# Patient Record
Sex: FEMALE | Race: WHITE | Employment: OTHER | URBAN - METROPOLITAN AREA
[De-identification: names, ages, dates, MRNs, and addresses within clinical notes are randomized per-mention and may not be internally consistent; named-entity substitution may affect disease eponyms.]

---

## 2022-05-26 DIAGNOSIS — E03.9 PRIMARY HYPOTHYROIDISM: ICD-10-CM

## 2022-05-26 DIAGNOSIS — C73 PAPILLARY THYROID CARCINOMA (HCC): Primary | ICD-10-CM

## 2022-06-01 ENCOUNTER — OFFICE VISIT (OUTPATIENT)
Dept: ENDOCRINOLOGY | Age: 67
End: 2022-06-01
Payer: MEDICARE

## 2022-06-01 VITALS
DIASTOLIC BLOOD PRESSURE: 86 MMHG | HEART RATE: 60 BPM | OXYGEN SATURATION: 95 % | HEIGHT: 66 IN | WEIGHT: 204 LBS | BODY MASS INDEX: 32.78 KG/M2 | SYSTOLIC BLOOD PRESSURE: 126 MMHG

## 2022-06-01 DIAGNOSIS — E03.9 PRIMARY HYPOTHYROIDISM: ICD-10-CM

## 2022-06-01 DIAGNOSIS — C73 PAPILLARY THYROID CARCINOMA (HCC): Primary | ICD-10-CM

## 2022-06-01 PROCEDURE — 99214 OFFICE O/P EST MOD 30 MIN: CPT | Performed by: INTERNAL MEDICINE

## 2022-06-01 PROCEDURE — 1123F ACP DISCUSS/DSCN MKR DOCD: CPT | Performed by: INTERNAL MEDICINE

## 2022-06-01 RX ORDER — LEVOTHYROXINE SODIUM 100 MCG
100 TABLET ORAL DAILY
Qty: 90 TABLET | Refills: 3 | Status: SHIPPED | OUTPATIENT
Start: 2022-06-01

## 2022-06-01 RX ORDER — LEVOTHYROXINE SODIUM 100 MCG
100 TABLET ORAL DAILY
COMMUNITY
End: 2022-06-01 | Stop reason: SDUPTHER

## 2022-06-01 ASSESSMENT — ENCOUNTER SYMPTOMS
CONSTIPATION: 1
ROS SKIN COMMENTS: DENIES HAIR LOSS, DRY SKIN.
DIARRHEA: 0

## 2022-06-01 NOTE — PROGRESS NOTES
Gordon Byers MD, [facility]  [address], [address] [facility], [address] [place] [address]  Phone [phone]  Facsimile [phone]          Art Rosenthal is a 74 y.o. female seen 6/1/2022 for follow up of thyroid cancer and hypothyroidism        ASSESSMENT AND PLAN:    1. 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 Pathology did not reveal any high risk features and I therefore did not recommend radioactive iodine remnant ablation. Neck ultrasound negative 1/2020.  Her thyroglobulin level has been very low as expected, most recently 5/2022. She will follow-up in 1 year with a thyroglobulin level prior to visit. I will consider repeating a neck ultrasound in 3-5 years from the prior study.      2. Primary hypothyroidism  Goal TSH 0.4-2.0 (although 0.1-0.4 is acceptable in the absence of thyrotoxic symptoms). - SYNTHROID 100 MCG tablet; Take 1 tablet by mouth Daily  Dispense: 90 tablet; Refill: 3      Follow-up and Dispositions    · Return in about 1 year (around 6/1/2023). HISTORY OF PRESENT ILLNESS:    THYROID CANCER    Presentation: Thyroid nodule status post FNA biopsy (Dr. Phong Dubois) revealing dysplastic cells, status post total thyroidectomy (Dr. Mónica Vallejo) 4/19/2011 (pathology revealed 1 cm follicular variant PTC in left lobe, no extracapsular extension, no lymphovascular invasion, 3 benign lymph nodes, chronic lymphocytic thyroiditis, pT1aN0). Current symptoms: Denies neck lumps, lymphadenopathy.      Imaging:   Neck ultrasound 12/9/2011: No suspicious lymph nodes. Neck ultrasound 6/6/2013: No suspicious lymph nodes. Neck ultrasound 4/15/2015: No suspicious lymph nodes or thyroid bed masses. Neck ultrasound 8/7/2015: No masses in the right thyroid bed. There is a right level V lymph node measuring 0.59 x 0.94 x 1.30 cm with a central fatty hilum.  No masses in the left thyroid bed. There is a left level II lymph node measuring 0.43 x 0.81 x 1.12 cm with a generous central fatty hilum. Neck ultrasound 6/18/2018: The thyroid gland is surgically absent.  There are no masses in the thyroid bed.  Examination of the central and lateral cervical compartments reveals no abnormal lymph nodes bilaterally. Neck ultrasound 1/13/2020: The thyroid gland is surgically absent.  There are no masses in the thyroid bed.  Examination of the central and lateral cervical compartments reveals no abnormal lymph nodes bilaterally. Labs:   3/15/2011: TSH 7.73, TPO Ab 650, Tg Ab <20.   6/7/2011: TSH 0.05, free T4 0.94, Tg <0.2, Tg Ab <20.   8/8/2011: TSH 0.07.   11/14/2011: TSH 0.29, free T4 1.12, Tg <0.2, Tg Ab <20.   5/18/2012: TSH 0.17, free T4 1.08, Tg <0.2, Tg Ab <20.   11/21/2012: TSH 0.120, free T4 1.65, Tg <0.2, Tg Ab <20.  5/14/2013: TSH 2.170, Tg <0.2, Tg Ab <20.   5/9/2014: TSH 0.470, Tg Ab <20.   5/4/2015: TSH 1.430, Tg <0.1, Tg Ab <1.   4/28/2016: TSH 0.033, free T4 1.34, Tg <0.1, Tg Ab <1.  8/3/2016: TSH 1.020, free T4 1.2.    2/6/2017: TSH 1.120, Tg 0.2, Tg Ab <1.0.  6/13/2018: TSH 1.294, free T4 1.18, Tg Ab <1.  6/18/2018: Tg 0.3, Tg Ab <1.0.  6/5/2019: TSH 0.541, Tg 0.3, Tg Ab <1.0.  1/13/2020: TSH 0.486, Tg 0.1, Tg Ab <1.0.  6/11/2020: TSH 0.369, free T4 1.08, Tg 0.2, Tg Ab <1.  5/21/2021: TSH 0.613, Tg Ab <1.  6/1/2021: Tg 0.2, Tg Ab <1.0.  5/16/2022: TSH 0.360 (0.350-4.940), Tg 0.1, Tg Ab <1.       HYPOTHYROIDISM    Presentation: Long-standing hypothyroidism secondary to Hashimoto's thyroiditis. Symptoms: See review of systems. Treatment status: Takes name brand correctly. Review of Systems   Constitutional: Negative for diaphoresis and fatigue. Weight decreased 12 pounds since last visit. Cardiovascular: Negative for palpitations. Gastrointestinal: Positive for constipation (if she does not take Metamucil). Negative for diarrhea.    Endocrine: Negative for cold intolerance and heat intolerance. Skin:        Denies hair loss, dry skin. Neurological: Negative for tremors. Vital Signs:  /86   Pulse 60   Ht 5' 5.5\" (1.664 m)   Wt 204 lb (92.5 kg)   SpO2 95%   BMI 33.43 kg/m²     Wt Readings from Last 3 Encounters:   06/01/22 204 lb (92.5 kg)   06/01/21 216 lb (98 kg)       Physical Exam  Constitutional:       General: She is not in acute distress. Neck:      Comments: Thyroidectomy scar. Cardiovascular:      Rate and Rhythm: Normal rate and regular rhythm. Lymphadenopathy:      Cervical: No cervical adenopathy. Neurological:      Motor: No tremor. Orders Placed This Encounter   Procedures    TSH with Reflex     Standing Status:   Future     Standing Expiration Date:   6/1/2024    Thyroglobulin Panel     Standing Status:   Future     Standing Expiration Date:   6/1/2024       Current Outpatient Medications   Medication Sig Dispense Refill    METAMUCIL FIBER PO Take by mouth      SYNTHROID 100 MCG tablet Take 1 tablet by mouth Daily 90 tablet 3     No current facility-administered medications for this visit.

## 2023-05-13 LAB — TSH SERPL DL<=0.005 MIU/L-ACNC: 0.12 UIU/ML (ref 0.45–4.5)

## 2023-05-14 LAB
THYROGLOB AB SERPL-ACNC: <1 IU/ML (ref 0–0.9)
THYROGLOB SERPL-MCNC: 0.2 NG/ML (ref 1.5–38.5)

## 2023-05-18 DIAGNOSIS — E03.9 PRIMARY HYPOTHYROIDISM: ICD-10-CM

## 2023-05-18 RX ORDER — LEVOTHYROXINE SODIUM 100 MCG
TABLET ORAL
Qty: 90 TABLET | Refills: 3 | OUTPATIENT
Start: 2023-05-18

## 2023-06-01 ENCOUNTER — OFFICE VISIT (OUTPATIENT)
Dept: ENDOCRINOLOGY | Age: 68
End: 2023-06-01
Payer: MEDICARE

## 2023-06-01 VITALS
SYSTOLIC BLOOD PRESSURE: 126 MMHG | DIASTOLIC BLOOD PRESSURE: 86 MMHG | BODY MASS INDEX: 34.25 KG/M2 | WEIGHT: 209 LBS | HEART RATE: 94 BPM | OXYGEN SATURATION: 94 %

## 2023-06-01 DIAGNOSIS — C73 PAPILLARY THYROID CARCINOMA (HCC): Primary | ICD-10-CM

## 2023-06-01 DIAGNOSIS — E03.9 PRIMARY HYPOTHYROIDISM: ICD-10-CM

## 2023-06-01 PROCEDURE — 1123F ACP DISCUSS/DSCN MKR DOCD: CPT | Performed by: INTERNAL MEDICINE

## 2023-06-01 PROCEDURE — 99214 OFFICE O/P EST MOD 30 MIN: CPT | Performed by: INTERNAL MEDICINE

## 2023-06-01 RX ORDER — LEVOTHYROXINE SODIUM 100 MCG
100 TABLET ORAL DAILY
Qty: 90 TABLET | Refills: 3 | Status: SHIPPED | OUTPATIENT
Start: 2023-06-01

## 2023-06-01 ASSESSMENT — ENCOUNTER SYMPTOMS
ROS SKIN COMMENTS: DENIES HAIR LOSS, DRY SKIN.
CONSTIPATION: 1
DIARRHEA: 0

## 2023-06-01 NOTE — PROGRESS NOTES
Gordon Johnson MD, [facility]  [address], [address] [facility], [address] [place] [address]  Phone [phone]  Facsimile [phone]          Estefani Monk is a 79 y.o. female seen 6/1/2023 for follow up of thyroid cancer and hypothyroidism        ASSESSMENT AND PLAN:    1. 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 Pathology did not reveal any high risk features and I therefore did not recommend radioactive iodine remnant ablation. Neck ultrasound negative 1/2020. Her thyroglobulin level has been very low as expected, most recently 5/2023. She will follow-up in 1 year with a thyroglobulin level prior to visit. I will consider repeating a neck ultrasound in 4-5 years from the prior study. 2. Primary hypothyroidism  Goal TSH 0.4-2.0 (although 0.1-0.4 is acceptable in the absence of thyrotoxic symptoms). - SYNTHROID 100 MCG tablet; Take 1 tablet by mouth Daily  Dispense: 90 tablet; Refill: 3      Follow-up and Dispositions    Return in about 1 year (around 6/1/2024). HISTORY OF PRESENT ILLNESS:    THYROID CANCER    Presentation: Thyroid nodule status post FNA biopsy (Dr. Catarino Grande) revealing dysplastic cells, status post total thyroidectomy (Dr. Benny Almendarez) 4/19/2011 (pathology revealed 1 cm follicular variant PTC in left lobe, no extracapsular extension, no lymphovascular invasion, 3 benign lymph nodes, chronic lymphocytic thyroiditis, pT1aN0). Current symptoms: Denies neck lumps, lymphadenopathy. Imaging:   Neck ultrasound 12/9/2011: No suspicious lymph nodes. Neck ultrasound 6/6/2013: No suspicious lymph nodes. Neck ultrasound 4/15/2015: No suspicious lymph nodes or thyroid bed masses. Neck ultrasound 8/7/2015: No masses in the right thyroid bed. There is a right level V lymph node measuring 0.59 x 0.94 x 1.30 cm with a central fatty hilum.  No masses in

## 2023-06-07 ENCOUNTER — TELEPHONE (OUTPATIENT)
Dept: ENDOCRINOLOGY | Age: 68
End: 2023-06-07

## 2023-06-07 NOTE — TELEPHONE ENCOUNTER
Pt called asking for refills on synthroid due to [facility] not having it. I called the pharmacy to ask about the situation, they have her med and refills. Called patient to let her know. Pt expressed understanding. PT

## 2024-05-12 DIAGNOSIS — E03.9 PRIMARY HYPOTHYROIDISM: ICD-10-CM

## 2024-05-13 RX ORDER — LEVOTHYROXINE SODIUM 100 MCG
100 TABLET ORAL DAILY
Qty: 90 TABLET | Refills: 3 | OUTPATIENT
Start: 2024-05-13

## 2024-06-03 ENCOUNTER — OFFICE VISIT (OUTPATIENT)
Dept: ENDOCRINOLOGY | Age: 69
End: 2024-06-03
Payer: MEDICARE

## 2024-06-03 VITALS
HEART RATE: 65 BPM | BODY MASS INDEX: 34.09 KG/M2 | SYSTOLIC BLOOD PRESSURE: 122 MMHG | WEIGHT: 208 LBS | DIASTOLIC BLOOD PRESSURE: 82 MMHG | OXYGEN SATURATION: 94 %

## 2024-06-03 DIAGNOSIS — Z85.850 HISTORY OF THYROID CANCER: Primary | ICD-10-CM

## 2024-06-03 DIAGNOSIS — E03.9 PRIMARY HYPOTHYROIDISM: ICD-10-CM

## 2024-06-03 PROCEDURE — 99214 OFFICE O/P EST MOD 30 MIN: CPT | Performed by: INTERNAL MEDICINE

## 2024-06-03 PROCEDURE — 1123F ACP DISCUSS/DSCN MKR DOCD: CPT | Performed by: INTERNAL MEDICINE

## 2024-06-03 PROCEDURE — G2211 COMPLEX E/M VISIT ADD ON: HCPCS | Performed by: INTERNAL MEDICINE

## 2024-06-03 RX ORDER — LEVOTHYROXINE SODIUM 100 MCG
100 TABLET ORAL DAILY
Qty: 90 TABLET | Refills: 3 | Status: SHIPPED | OUTPATIENT
Start: 2024-06-03

## 2024-06-03 RX ORDER — VIBEGRON 75 MG/1
75 TABLET, FILM COATED ORAL DAILY
COMMUNITY

## 2024-06-03 ASSESSMENT — ENCOUNTER SYMPTOMS
ROS SKIN COMMENTS: DENIES HAIR LOSS, DRY SKIN.
DIARRHEA: 0
CONSTIPATION: 0

## 2024-06-03 NOTE — PROGRESS NOTES
Gordon Collado MD, [facility]  [address] [place] [address], [address]  [place] [address]  Phone [phone]  Facsimile [phone]          Bre Curtis is a 69 y.o. female seen 6/3/2024 for follow up of thyroid cancer and hypothyroidism        ASSESSMENT AND PLAN:    1. 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 Pathology did not reveal any high risk features and I therefore did not recommend radioactive iodine remnant ablation. Neck ultrasound negative 1/2020.  Her thyroglobulin level has been very low as expected, most recently 5/2024.  She will follow-up in 1 year with a thyroglobulin level prior to visit.  I will consider repeating a neck ultrasound in 5 years from the prior study.      2. Primary hypothyroidism  Goal TSH 0.4-2.0 (although 0.1-0.4 is acceptable in the absence of thyrotoxic symptoms).    - SYNTHROID 100 MCG tablet; Take 1 tablet by mouth Daily  Dispense: 90 tablet; Refill: 3      Follow-up and Dispositions    Return in about 1 year (around 6/3/2025).         HISTORY OF PRESENT ILLNESS:    THYROID CANCER    Presentation: Thyroid nodule status post FNA biopsy (Dr. Lennon) revealing dysplastic cells, status post total thyroidectomy (Dr. Bonilla) 4/19/2011 (pathology revealed 1 cm follicular variant PTC in left lobe, no extracapsular extension, no lymphovascular invasion, 3 benign lymph nodes, chronic lymphocytic thyroiditis, pT1aN0).     Current symptoms: Denies neck lumps, lymphadenopathy.      Imaging:   Neck ultrasound 12/9/2011: No suspicious lymph nodes.   Neck ultrasound 6/6/2013: No suspicious lymph nodes.   Neck ultrasound 4/15/2015: No suspicious lymph nodes or thyroid bed masses.   Neck ultrasound 8/7/2015: No masses in the right thyroid bed. There is a right level V lymph node measuring 0.59 x 0.94 x 1.30 cm with a central fatty hilum. No masses

## 2025-05-14 DIAGNOSIS — E03.9 PRIMARY HYPOTHYROIDISM: ICD-10-CM

## 2025-05-14 RX ORDER — LEVOTHYROXINE SODIUM 100 MCG
100 TABLET ORAL DAILY
Qty: 90 TABLET | Refills: 0 | Status: SHIPPED | OUTPATIENT
Start: 2025-05-14

## 2025-05-14 NOTE — TELEPHONE ENCOUNTER
Pt comes in on 6/3/25, does not have enough medication left to cover.  Refill pended.  Reminder to do labs

## 2025-05-15 LAB
T3FREE SERPL-MCNC: 3.1 PG/ML (ref 2–4.4)
T4 FREE SERPL-MCNC: 1.4 NG/DL (ref 0.82–1.77)
TSH SERPL DL<=0.005 MIU/L-ACNC: 0.23 UIU/ML (ref 0.45–4.5)

## 2025-05-16 LAB
THYROGLOB AB SERPL-ACNC: <1 IU/ML (ref 0–0.9)
THYROGLOB SERPL-MCNC: 0.2 NG/ML (ref 1.5–38.5)

## 2025-06-03 ENCOUNTER — OFFICE VISIT (OUTPATIENT)
Dept: ENDOCRINOLOGY | Age: 70
End: 2025-06-03
Payer: MEDICARE

## 2025-06-03 VITALS
BODY MASS INDEX: 33.75 KG/M2 | RESPIRATION RATE: 16 BRPM | HEIGHT: 66 IN | SYSTOLIC BLOOD PRESSURE: 118 MMHG | WEIGHT: 210 LBS | DIASTOLIC BLOOD PRESSURE: 72 MMHG | HEART RATE: 68 BPM | OXYGEN SATURATION: 96 %

## 2025-06-03 DIAGNOSIS — Z85.850 HISTORY OF THYROID CANCER: Primary | ICD-10-CM

## 2025-06-03 DIAGNOSIS — E03.9 PRIMARY HYPOTHYROIDISM: ICD-10-CM

## 2025-06-03 PROCEDURE — 1123F ACP DISCUSS/DSCN MKR DOCD: CPT | Performed by: INTERNAL MEDICINE

## 2025-06-03 PROCEDURE — 1159F MED LIST DOCD IN RCRD: CPT | Performed by: INTERNAL MEDICINE

## 2025-06-03 PROCEDURE — 76536 US EXAM OF HEAD AND NECK: CPT | Performed by: INTERNAL MEDICINE

## 2025-06-03 PROCEDURE — 99214 OFFICE O/P EST MOD 30 MIN: CPT | Performed by: INTERNAL MEDICINE

## 2025-06-03 RX ORDER — LEVOTHYROXINE SODIUM 100 MCG
100 TABLET ORAL DAILY
Qty: 90 TABLET | Refills: 3 | Status: SHIPPED | OUTPATIENT
Start: 2025-06-03

## 2025-06-03 ASSESSMENT — ENCOUNTER SYMPTOMS
CONSTIPATION: 0
DIARRHEA: 0
ROS SKIN COMMENTS: DENIES HAIR LOSS, DRY SKIN.

## 2025-06-03 NOTE — PROGRESS NOTES
Gordon Collado MD, [facility]  [address] [place] [address], [address]  [place] [address]  Phone [phone]  Facsimile [phone]          Bre Curtis is a 70 y.o. female seen 6/3/2025 for follow up of thyroid cancer and hypothyroidism        ASSESSMENT AND PLAN:    1. 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 Pathology did not reveal any high risk features and I therefore did not recommend radioactive iodine remnant ablation. Neck ultrasound performed today was negative.  Her thyroglobulin level has been very low as expected, most recently 5/2025.  At this point she appears to be disease-free.  She will follow-up in 1 year with a thyroglobulin level prior to visit.      2. Primary hypothyroidism  Goal TSH 0.4-2.0 (although 0.1-0.4 is acceptable in the absence of thyrotoxic symptoms).    - SYNTHROID 100 MCG tablet; Take 1 tablet by mouth Daily  Dispense: 90 tablet; Refill: 3          Procedures:    Neck ultrasound 6/3/2025: The thyroid gland is surgically absent.  There are no masses in the thyroid bed.  Examination of the central and lateral cervical compartments reveals no abnormal lymph nodes bilaterally.      Follow-up and Dispositions    Return in about 1 year (around 6/3/2026).         HISTORY OF PRESENT ILLNESS:    THYROID CANCER    Presentation: Thyroid nodule status post FNA biopsy (Dr. Lennon) revealing dysplastic cells, status post total thyroidectomy (Dr. Bonilla) 4/19/2011 (pathology revealed 1 cm follicular variant PTC in left lobe, no extracapsular extension, no lymphovascular invasion, 3 benign lymph nodes, chronic lymphocytic thyroiditis, pT1aN0).     Current symptoms: Denies neck lumps, lymphadenopathy.      Imaging:   Neck ultrasound 12/9/2011: No suspicious lymph nodes.   Neck ultrasound 6/6/2013: No suspicious lymph nodes.   Neck ultrasound 4/15/2015: